# Patient Record
Sex: FEMALE | Race: WHITE | NOT HISPANIC OR LATINO | URBAN - METROPOLITAN AREA
[De-identification: names, ages, dates, MRNs, and addresses within clinical notes are randomized per-mention and may not be internally consistent; named-entity substitution may affect disease eponyms.]

---

## 2021-07-15 ENCOUNTER — PREPPED CHART (OUTPATIENT)
Dept: URBAN - METROPOLITAN AREA CLINIC 66 | Facility: CLINIC | Age: 81
End: 2021-07-15

## 2021-07-15 PROBLEM — Z96.1 PSEUDOPHAKIA: Noted: 2021-07-15

## 2021-07-15 PROBLEM — E11.3293 MILD NONPROLIFERATIVE DIABETIC RETINOPATHY: Noted: 2021-07-15

## 2021-07-15 PROBLEM — H43.812 POSTERIOR VITREOUS DETACHMENT: Noted: 2021-07-15

## 2021-07-15 PROBLEM — H35.3131 DRY AMD, EARLY DRY STAGE: Noted: 2021-07-15

## 2021-07-15 PROBLEM — H43.813 POSTERIOR VITREOUS DETACHMENT: Noted: 2021-07-15

## 2022-05-05 ASSESSMENT — VISUAL ACUITY
OD_SC: 20/30-2
OS_SC: 20/25+1

## 2022-05-06 ASSESSMENT — TONOMETRY
OS_IOP_MMHG: 18
OD_IOP_MMHG: 23

## 2022-05-16 ENCOUNTER — FOLLOW UP (OUTPATIENT)
Dept: URBAN - METROPOLITAN AREA CLINIC 66 | Facility: CLINIC | Age: 82
End: 2022-05-16

## 2022-05-16 DIAGNOSIS — E11.3293: ICD-10-CM

## 2022-05-16 DIAGNOSIS — H43.812: ICD-10-CM

## 2022-05-16 DIAGNOSIS — H35.3131: ICD-10-CM

## 2022-05-16 PROCEDURE — 92134 CPTRZ OPH DX IMG PST SGM RTA: CPT

## 2022-05-16 PROCEDURE — 92014 COMPRE OPH EXAM EST PT 1/>: CPT

## 2022-05-16 ASSESSMENT — VISUAL ACUITY
OD_SC: 20/25-1
OS_SC: 20/20

## 2022-05-16 ASSESSMENT — TONOMETRY
OD_IOP_MMHG: 20
OS_IOP_MMHG: 19

## 2022-08-31 ENCOUNTER — APPOINTMENT (RX ONLY)
Dept: URBAN - METROPOLITAN AREA CLINIC 42 | Facility: CLINIC | Age: 82
Setting detail: DERMATOLOGY
End: 2022-08-31

## 2022-08-31 DIAGNOSIS — I89.0 LYMPHEDEMA, NOT ELSEWHERE CLASSIFIED: ICD-10-CM | Status: INADEQUATELY CONTROLLED

## 2022-08-31 PROCEDURE — ? PRESCRIPTION

## 2022-08-31 PROCEDURE — ? OTHER

## 2022-08-31 PROCEDURE — ? ADDITIONAL NOTES

## 2022-08-31 PROCEDURE — ? COUNSELING

## 2022-08-31 PROCEDURE — 99204 OFFICE O/P NEW MOD 45 MIN: CPT

## 2022-08-31 RX ORDER — UREA 20 %
CREAM (GRAM) TOPICAL
Qty: 170 | Refills: 0 | Status: ERX | COMMUNITY
Start: 2022-08-31

## 2022-08-31 RX ADMIN — Medication: at 00:00

## 2022-08-31 ASSESSMENT — LOCATION DETAILED DESCRIPTION DERM
LOCATION DETAILED: RIGHT DISTAL PRETIBIAL REGION
LOCATION DETAILED: LEFT DISTAL PRETIBIAL REGION

## 2022-08-31 ASSESSMENT — LOCATION SIMPLE DESCRIPTION DERM
LOCATION SIMPLE: RIGHT PRETIBIAL REGION
LOCATION SIMPLE: LEFT PRETIBIAL REGION

## 2022-08-31 ASSESSMENT — LOCATION ZONE DERM: LOCATION ZONE: LEG

## 2022-08-31 NOTE — HPI: RASH
What Type Of Note Output Would You Prefer (Optional)?: Standard Output
How Severe Is Your Rash?: severe
Is This A New Presentation, Or A Follow-Up?: Rash
Additional History: Patient with long-standing history of lymphedema. Admits to use of compression stockings. She denies prior evaluation and management with a lymphedema clinic.

## 2022-08-31 NOTE — PROCEDURE: ADDITIONAL NOTES
Additional Notes: Plan to send evaluation note to PCP for consideration of referral to lymphedema clinic.
Detail Level: Simple
Render Risk Assessment In Note?: no

## 2022-08-31 NOTE — PROCEDURE: OTHER
Other (Free Text): Chronic recurrent condition — Uncontrolled, not at treatment goal \\n\\nPlan:\\n- Reviewed with the patient the etiology of this rash is secondary to chronic, uncontrolled lymphedema\\n- Reviewed with the patient the mainstay of treatment is the use of compression dressings along with topical steroids or keratolytic creams/lotions. \\n- Based on the patients PMH of diabetes and hypertension, use of strong, class I steroids is relatively contraindicated given the amount of steroids that would be applied nightly would mimic the systemic effects of oral prednisone (e.g. elevated glucose, BP abnormalities) \\n- Currently, recommend the use of Urea cream BID along with compression stockings \\n- Recommend evaluation and management with lymphedema clinic; they often provide manual compression/dressing changes/skin directed treatments to alleviate this condition\\n- R/B/SE of topical medications reviewed\\n- All questions answered
Render Risk Assessment In Note?: no
Note Text (......Xxx Chief Complaint.): This diagnosis correlates with the
Detail Level: Zone
Other (Free Text): Chronic recurrent condition — Uncontrolled, not at treatment goal \\n\\nPlan:\\n- Reviewed with the patient the etiology of this rash is secondary to chronic, uncontrolled lymphedema\\n- Reviewed with the patient the mainstay of treatment is the use of compression dressings along with topical steroids or keratolytic creams/lotions. \\n- Based on the patients PMH of diabetes and hypertension, use of strong, class I steroids is relatively contraindicated given the amount of steroids that would be applied nightly would mimic the systemic effects of oral prednisone (e.g. elevated glucose, BP abnormalities) \\n- Currently, recommend the use of Urea cream BID along with compression stockings \\n- Recommend evaluation and management with lymphedema clinic; they often provide manual compression/dressing changes/skin directed treatments to alleviate this condition\\n- R/B/SE of topical medications reviewed\\n- All questions answered

## 2023-05-18 ENCOUNTER — FOLLOW UP (OUTPATIENT)
Dept: URBAN - METROPOLITAN AREA CLINIC 66 | Facility: CLINIC | Age: 83
End: 2023-05-18

## 2023-05-18 DIAGNOSIS — H43.812: ICD-10-CM

## 2023-05-18 DIAGNOSIS — H35.3131: ICD-10-CM

## 2023-05-18 DIAGNOSIS — E11.3293: ICD-10-CM

## 2023-05-18 PROCEDURE — 92134 CPTRZ OPH DX IMG PST SGM RTA: CPT

## 2023-05-18 PROCEDURE — 92014 COMPRE OPH EXAM EST PT 1/>: CPT

## 2023-05-18 PROCEDURE — 92202 OPSCPY EXTND ON/MAC DRAW: CPT

## 2023-05-18 ASSESSMENT — VISUAL ACUITY
OS_SC: 20/25+2
OD_SC: 20/25-1

## 2023-05-18 ASSESSMENT — TONOMETRY
OD_IOP_MMHG: 16
OS_IOP_MMHG: 15

## 2024-05-16 ENCOUNTER — FOLLOW UP (OUTPATIENT)
Dept: URBAN - METROPOLITAN AREA CLINIC 66 | Facility: CLINIC | Age: 84
End: 2024-05-16

## 2024-05-16 DIAGNOSIS — Z96.1: ICD-10-CM

## 2024-05-16 DIAGNOSIS — H26.493: ICD-10-CM

## 2024-05-16 DIAGNOSIS — E11.3293: ICD-10-CM

## 2024-05-16 DIAGNOSIS — H35.3131: ICD-10-CM

## 2024-05-16 DIAGNOSIS — H43.812: ICD-10-CM

## 2024-05-16 PROCEDURE — 92201 OPSCPY EXTND RTA DRAW UNI/BI: CPT

## 2024-05-16 PROCEDURE — 92134 CPTRZ OPH DX IMG PST SGM RTA: CPT

## 2024-05-16 PROCEDURE — 92014 COMPRE OPH EXAM EST PT 1/>: CPT

## 2024-05-16 ASSESSMENT — TONOMETRY
OD_IOP_MMHG: 14
OS_IOP_MMHG: 16

## 2024-05-16 ASSESSMENT — VISUAL ACUITY
OD_SC: 20/25-2
OS_SC: 20/30+2

## 2025-05-22 ENCOUNTER — FOLLOW UP (OUTPATIENT)
Dept: URBAN - METROPOLITAN AREA CLINIC 66 | Facility: CLINIC | Age: 85
End: 2025-05-22

## 2025-05-22 DIAGNOSIS — E11.3293: ICD-10-CM

## 2025-05-22 DIAGNOSIS — H35.3131: ICD-10-CM

## 2025-05-22 PROCEDURE — 92202 OPSCPY EXTND ON/MAC DRAW: CPT

## 2025-05-22 PROCEDURE — 92134 CPTRZ OPH DX IMG PST SGM RTA: CPT

## 2025-05-22 PROCEDURE — 92014 COMPRE OPH EXAM EST PT 1/>: CPT

## 2025-05-22 ASSESSMENT — TONOMETRY
OS_IOP_MMHG: 14
OD_IOP_MMHG: 13

## 2025-05-22 ASSESSMENT — VISUAL ACUITY
OS_SC: 20/25-2
OD_SC: 20/20-2